# Patient Record
Sex: MALE | Race: WHITE | NOT HISPANIC OR LATINO
[De-identification: names, ages, dates, MRNs, and addresses within clinical notes are randomized per-mention and may not be internally consistent; named-entity substitution may affect disease eponyms.]

---

## 2022-10-03 ENCOUNTER — NON-APPOINTMENT (OUTPATIENT)
Age: 37
End: 2022-10-03

## 2024-01-17 ENCOUNTER — NON-APPOINTMENT (OUTPATIENT)
Age: 39
End: 2024-01-17

## 2024-04-08 PROBLEM — Z00.00 ENCOUNTER FOR PREVENTIVE HEALTH EXAMINATION: Status: ACTIVE | Noted: 2024-04-08

## 2024-04-09 ENCOUNTER — APPOINTMENT (OUTPATIENT)
Dept: ORTHOPEDIC SURGERY | Facility: CLINIC | Age: 39
End: 2024-04-09
Payer: OTHER MISCELLANEOUS

## 2024-04-09 VITALS — WEIGHT: 165 LBS | HEIGHT: 70 IN | BODY MASS INDEX: 23.62 KG/M2

## 2024-04-09 DIAGNOSIS — Z78.9 OTHER SPECIFIED HEALTH STATUS: ICD-10-CM

## 2024-04-09 DIAGNOSIS — S92.355A NONDISPLACED FRACTURE OF FIFTH METATARSAL BONE, LEFT FOOT, INITIAL ENCOUNTER FOR CLOSED FRACTURE: ICD-10-CM

## 2024-04-09 PROCEDURE — 28470 CLTX METATARSAL FX WO MNP EA: CPT

## 2024-04-09 PROCEDURE — 99204 OFFICE O/P NEW MOD 45 MIN: CPT

## 2024-04-09 NOTE — ASSESSMENT
[FreeTextEntry1] : 39 yo male presenting with closed, nondisplaced spiral fracture of left 5th metatarsal shaft, fracture is in acceptable alignment. Recommend non-surgical management. -LLE WBAT in hard soled shoe, rx given today -Activities as tolerated, avoid strenuous/impact related activities -work note given today, patient able to return to work on a light duty/desk duty status effective Tuesday 5/16/24 -Rest, ice, compression, elevation, NSAIDs PRN for pain.  -All questions answered -F/u 6 weeks with repeat x-rays  The diagnosis was explained in detail. The potential non-surgical and surgical treatments were reviewed. The relative risks and benefits of each option were considered relative to the patients age, activity level, medical history, symptom severity and previously attempted treatments.  The patient was advised to consult with their primary medical provider prior to initiation of any new medications to reduce the risk of adverse effects specific to their long-term home medications and medical history. The risk of gastrointestinal irritation and kidney injury specific to long-term NSAID use was discussed.  Entered by Jayro Self PA-C acting as scribe. Dr. Blas Attestation The documentation recorded by the scribe, in my presence, accurately reflects the service I, Dr. Blas, personally performed, and the decisions made by me with my edits as appropriate. Thrombosis of kidney dialysis arteriovenous graft, initial encounter

## 2024-04-09 NOTE — WORK
[Fracture] : fracture [Was the competent medical cause of the injury] : was the competent medical cause of the injury [Are consistent with the injury] : are consistent with the injury [Consistent with my objective findings] : consistent with my objective findings [Partial] : partial [Does not reveal pre-existing condition(s) that may affect treatment/prognosis] : does not reveal pre-existing condition(s) that may affect treatment/prognosis [Other: ___] : [unfilled] [Can return to work with limitations on ______] : can return to work with limitations on [unfilled] [Patient] : patient [No Rx restrictions] : No Rx restrictions. [I provided the services listed above] :  I provided the services listed above. [Sedentary Work:] : Sedentary Work: Exerting up to 10 pounds of force occasionally and/or a negligible amount of force frequently to lift, carry, push, pull or otherwise move objects, including the human body. Sedentary work involves sitting most of the time, but may involve walking or standing for brief periods of time. Jobs are sedentary if walking and standing are required only occasionally and all other sedentary criteria are met.

## 2024-04-09 NOTE — HISTORY OF PRESENT ILLNESS
[Work related] : work related [Sudden] : sudden [6] : 6 [1] : 2 [Standing] : standing [Walking] : walking [Exercising] : exercising [Full time] : Work status: full time [Sharp] : sharp [de-identified] : 38M Patient presents with left foot pain s/p WC injury 04/04/24. 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 Patient reports that he had radiographs performed at [facility] that confirmed a metatarsal fracture. Currently ambulating with a cane. Patient reports very minimal pain at rest and some pain with weight bearing. Currently taking Motrin and Tylenol PRN with some relief. [] : Patient is currently injured and not playing sports: no [FreeTextEntry1] : Left Foot [FreeTextEntry6] : soreness [de-identified] : X-rays 04/04/24 [de-identified] : Sterile Processing

## 2024-04-09 NOTE — PHYSICAL EXAM
[de-identified] : Examination of the left foot and ankle is as follows: Inspection: swelling, ecchymosis of foot, mild swelling of dorsolateral foot, but no abrasion, laceration, no erythema Palpation: 5th metatarsal shaft tenderness ROM: plantarflexion 20 degrees, inversion 15 degrees, eversion 10 degrees, dorsiflexion 10 degrees Strength: dorsiflexion 4/5, plantar flexion 4/5, inversion 4/5, eversion 4/5, EHL 5/5, FHL 5/5 Vascular: dorsalis pedis pulse: 2+, posterior tibialis pulse: 2+ Neuro: Sensation present to light touch in all distributions Gait: mildly antalgic, patient ambulates with cane  X-rays of the left foot is as follows: x-rays reviewed from [facility] Foot 3 view: closed, nondisplaced spiral fracture of 5th metatarsal shaft

## 2024-05-21 ENCOUNTER — APPOINTMENT (OUTPATIENT)
Dept: ORTHOPEDIC SURGERY | Facility: CLINIC | Age: 39
End: 2024-05-21
Payer: OTHER MISCELLANEOUS

## 2024-05-21 PROCEDURE — 73630 X-RAY EXAM OF FOOT: CPT | Mod: LT

## 2024-05-21 NOTE — HISTORY OF PRESENT ILLNESS
[Work related] : work related [Sudden] : sudden [5] : 5 [1] : 2 [Dull/Aching] : dull/aching [Intermittent] : intermittent [Rest] : rest [Full time] : Work status: full time [de-identified] : Patient here for left foot. Patient being treated for  Closed nondisplaced fracture of fifth metatarsal bone. Patient states he wore the hard sole shoe for approx 2 weeks the took it off. Patient states he has aching pain with weight bearing. 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 [FreeTextEntry6] : soreness [de-identified] : Movement  [de-identified] : X-rays 04/04/24 [de-identified] : Sterile Processing

## 2024-05-21 NOTE — ASSESSMENT
[FreeTextEntry1] : 39 yo male presenting for f/u of closed, nondisplaced spiral fracture of left 5th metatarsal shaft, fracture is in acceptable alignment with increased callus formation. Patient reporting that his pain is minimal at this time. Recommend non-surgical management. -LLE WBAT -Continue to avoid strenuous/impact related activities -work note given today for continued light duty -Rest, ice, compression, elevation, NSAIDs PRN for pain.  -All questions answered -F/u 6 weeks with repeat x-rays  The diagnosis was explained in detail. The potential non-surgical and surgical treatments were reviewed. The relative risks and benefits of each option were considered relative to the patients age, activity level, medical history, symptom severity and previously attempted treatments.  The patient was advised to consult with their primary medical provider prior to initiation of any new medications to reduce the risk of adverse effects specific to their long-term home medications and medical history. The risk of gastrointestinal irritation and kidney injury specific to long-term NSAID use was discussed.  Entered by Jayro Self PA-C acting as scribe. Dr. Blas Attestation The documentation recorded by the scribe, in my presence, accurately reflects the service I, Dr. Blas, personally performed, and the decisions made by me with my edits as appropriate.

## 2024-05-21 NOTE — PHYSICAL EXAM
[de-identified] : Examination of the left foot and ankle is as follows: Inspection: swelling, mild swelling of dorsolateral foot, but no abrasion, laceration, no erythema, no ecchymosis Palpation: 5th metatarsal shaft tenderness ROM: plantarflexion 30 degrees, inversion 25 degrees, eversion 15 degrees, dorsiflexion 15 degrees Strength: dorsiflexion 4/5, plantar flexion 4/5, inversion 4/5, eversion 4/5, EHL 5/5, FHL 5/5 Vascular: dorsalis pedis pulse: 2+, posterior tibialis pulse: 2+ Neuro: Sensation present to light touch in all distributions Gait: non-antalgic, patient ambulates without assistive devices  X-rays of the left foot is as follows: x-rays reviewed from [facility] Foot 3 view: closed, nondisplaced spiral fracture of 5th metatarsal shaft with increased callus formation

## 2024-07-02 ENCOUNTER — APPOINTMENT (OUTPATIENT)
Dept: ORTHOPEDIC SURGERY | Facility: CLINIC | Age: 39
End: 2024-07-02
Payer: OTHER MISCELLANEOUS

## 2024-07-02 DIAGNOSIS — Z78.9 OTHER SPECIFIED HEALTH STATUS: ICD-10-CM

## 2024-07-02 DIAGNOSIS — S92.355D NONDISPLACED FRACTURE OF FIFTH METATARSAL BONE, LEFT FOOT, SUBSEQUENT ENCOUNTER FOR FRACTURE WITH ROUTINE HEALING: ICD-10-CM

## 2024-07-02 PROCEDURE — 99024 POSTOP FOLLOW-UP VISIT: CPT

## 2024-07-02 PROCEDURE — 73630 X-RAY EXAM OF FOOT: CPT | Mod: LT
